# Patient Record
Sex: FEMALE | Race: WHITE | Employment: UNEMPLOYED | URBAN - METROPOLITAN AREA
[De-identification: names, ages, dates, MRNs, and addresses within clinical notes are randomized per-mention and may not be internally consistent; named-entity substitution may affect disease eponyms.]

---

## 2023-11-01 LAB
ABO, EXTERNAL RESULT: NORMAL
C. TRACHOMATIS, EXTERNAL RESULT: NEGATIVE
HEP B, EXTERNAL RESULT: NEGATIVE
HIV, EXTERNAL RESULT: NEGATIVE
N. GONORRHOEAE, EXTERNAL RESULT: NEGATIVE
RH FACTOR, EXTERNAL RESULT: POSITIVE
RPR, EXTERNAL RESULT: NORMAL
RUBELLA TITER, EXTERNAL RESULT: NORMAL

## 2024-04-03 ENCOUNTER — HOSPITAL ENCOUNTER (OUTPATIENT)
Facility: HOSPITAL | Age: 34
Setting detail: OBSERVATION
Discharge: HOME | End: 2024-04-03
Attending: OBSTETRICS & GYNECOLOGY | Admitting: OBSTETRICS & GYNECOLOGY
Payer: COMMERCIAL

## 2024-04-03 VITALS
HEART RATE: 90 BPM | SYSTOLIC BLOOD PRESSURE: 149 MMHG | RESPIRATION RATE: 16 BRPM | TEMPERATURE: 98.2 F | DIASTOLIC BLOOD PRESSURE: 73 MMHG | OXYGEN SATURATION: 97 %

## 2024-04-03 LAB
APPEARANCE UR: CLEAR
BILIRUB UR QL: NEGATIVE
COLOR UR: NORMAL
FIBRONECTIN FETAL VAG QL: NEGATIVE
GLUCOSE UR STRIP.AUTO-MCNC: NEGATIVE MG/DL
HGB UR QL STRIP: NEGATIVE
KETONES UR QL STRIP.AUTO: NEGATIVE MG/DL
LEUKOCYTE ESTERASE UR QL STRIP.AUTO: NEGATIVE
NITRITE UR QL STRIP.AUTO: NEGATIVE
PH UR STRIP: 8 (ref 5–8)
PROT UR STRIP-MCNC: NEGATIVE MG/DL
SP GR UR REFRACTOMETRY: <1.005 (ref 1–1.03)
UROBILINOGEN UR QL STRIP.AUTO: 0.2 EU/DL (ref 0.2–1)

## 2024-04-03 PROCEDURE — 96361 HYDRATE IV INFUSION ADD-ON: CPT

## 2024-04-03 PROCEDURE — 81002 URINALYSIS NONAUTO W/O SCOPE: CPT

## 2024-04-03 PROCEDURE — 96360 HYDRATION IV INFUSION INIT: CPT

## 2024-04-03 PROCEDURE — 2580000003 HC RX 258: Performed by: STUDENT IN AN ORGANIZED HEALTH CARE EDUCATION/TRAINING PROGRAM

## 2024-04-03 PROCEDURE — G0379 DIRECT REFER HOSPITAL OBSERV: HCPCS

## 2024-04-03 PROCEDURE — 82731 ASSAY OF FETAL FIBRONECTIN: CPT

## 2024-04-03 PROCEDURE — G0378 HOSPITAL OBSERVATION PER HR: HCPCS

## 2024-04-03 PROCEDURE — 2580000003 HC RX 258: Performed by: OBSTETRICS & GYNECOLOGY

## 2024-04-03 RX ORDER — SODIUM CHLORIDE, SODIUM LACTATE, POTASSIUM CHLORIDE, AND CALCIUM CHLORIDE .6; .31; .03; .02 G/100ML; G/100ML; G/100ML; G/100ML
1000 INJECTION, SOLUTION INTRAVENOUS ONCE
Status: COMPLETED | OUTPATIENT
Start: 2024-04-03 | End: 2024-04-03

## 2024-04-03 RX ADMIN — SODIUM CHLORIDE, POTASSIUM CHLORIDE, SODIUM LACTATE AND CALCIUM CHLORIDE 1000 ML: 600; 310; 30; 20 INJECTION, SOLUTION INTRAVENOUS at 12:14

## 2024-04-03 RX ADMIN — SODIUM CHLORIDE, POTASSIUM CHLORIDE, SODIUM LACTATE AND CALCIUM CHLORIDE 1000 ML: 600; 310; 30; 20 INJECTION, SOLUTION INTRAVENOUS at 10:00

## 2024-04-03 NOTE — H&P
History & Physical    Name: Orquidea Perera MRN: [id number]  SSN: [id number]    YOB: 1990  Age: 33 y.o.  Sex: female        Subjective:     Estimated Date of Delivery: 24    Ms. Perera is a  at 30w3d presenting to L&D for cramping, right sided abdominal pain. Denies LOF, VB. +GFM. Seen in the office earlier, cervix was 0/50 with 2+ ketones.     Initially zuly every 2-5 mins on L&D, received 2L IVF. Reports she is now feeling better. Some mild right sided abdominal pain, no cramping or contractions    Pregnancy complicated by:  Anxiety and depression: on zoloft 50mg daily  H/o partial thryoidectomy    OB History          3    Para   2    Term                AB        Living             SAB        IAB        Ectopic        Molar        Multiple        Live Births                  History reviewed. No pertinent past medical history.  Past Surgical History:   Procedure Laterality Date    THYROID LOBECTOMY       Social History     Occupational History    Not on file   Tobacco Use    Smoking status: Never    Smokeless tobacco: Never   Substance and Sexual Activity    Alcohol use: Not Currently    Drug use: Never    Sexual activity: Not on file     History reviewed. No pertinent family history.    Allergies   Allergen Reactions    Prednisone Hives     Prior to Admission medications    Not on File        Review of Systems: Pertinent items are noted in HPI.    Objective:     Vitals:  Vitals:    24 1531 24 1536 24 1541 24 1546   BP:       Pulse:       Resp:       Temp:       TempSrc:       SpO2: 92% 100% 99% 97%        Physical Exam:  Gen: NAD  Abd: Soft, nontender,     Membranes:  Intact  Fetal Heart Rate: Reactive  Baseline: 140 per minute  Variability: moderate  Accelerations: yes  Decelerations: none  Uterine contractions: irregular, every 10 minutes    SVE: 0/50/-3    Prenatal Labs:   No components found for: \"OBEXTABORH\", \"OBEXTABSCRN\", \"OBEXTRUBELLA\",

## 2024-04-03 NOTE — PROGRESS NOTES
1200: SBAR report received from HINA Jackman RN    1528:; Dr. De Jesus at bedside discussing POC with pt. Ffn ordered. SVE performed. Pt remains closed.     1621: Fetal fibronectin result is negative. Dr. De Jesus informed, okay to discharge pt at this time    1631: Discharge paperwork discussed with pt. Kick counts discussed, pt stated that she was knowledgeable about kick counts and understands how to perform them. What to expect at 30 weeks of pregnancy, jorden saldivar contractions and signs of labor were dicussed. When to contact physician was discussed and pt verbalized an understanding.     1640: Pt ambulates off unit.

## 2024-04-03 NOTE — PROGRESS NOTES
0936- Pt arrives to the unit ambulatory and stable sent from [facility] for malaise and dehydration.     1130- Bedside shift change report given to  RN (oncoming nurse) by Augustina RN (offgoing nurse). Report included the following information Nurse Handoff Report, Adult Overview, Intake/Output, MAR, and Recent Results.     1150- This RN spoke to MD De Jesus and notified her that pt is having contractions every 2-5 minutes that palpate mild and pt can feel some discomfort from some of them. Verbal order with read back for another 1,000mL bolus of lactated ringers and to send UA. Primary nurse  notified of orders.

## 2024-04-03 NOTE — LACTATION NOTE
Patient states that \"she plans to breastfeed.  She breast fed her second child but had to supplement at first.\"   Discussed with her the use of human donor milk if supplementation is needed.  Mom states\" I am comfortable with hand expression.\"  No questions about breastfeeding.

## 2024-05-05 ENCOUNTER — HOSPITAL ENCOUNTER (OUTPATIENT)
Facility: HOSPITAL | Age: 34
Setting detail: OBSERVATION
Discharge: HOME | End: 2024-05-05
Attending: OBSTETRICS & GYNECOLOGY | Admitting: OBSTETRICS & GYNECOLOGY
Payer: COMMERCIAL

## 2024-05-05 VITALS
RESPIRATION RATE: 16 BRPM | SYSTOLIC BLOOD PRESSURE: 113 MMHG | DIASTOLIC BLOOD PRESSURE: 66 MMHG | TEMPERATURE: 98 F | HEART RATE: 85 BPM | OXYGEN SATURATION: 99 %

## 2024-05-05 PROBLEM — Z3A.35 35 WEEKS GESTATION OF PREGNANCY: Status: ACTIVE | Noted: 2024-05-05

## 2024-05-05 PROBLEM — O98.813 OTHER MATERNAL INFECTIOUS AND PARASITIC DISEASES COMPLICATING PREGNANCY, THIRD TRIMESTER: Status: ACTIVE | Noted: 2024-05-05

## 2024-05-05 PROBLEM — A09 INFECTIOUS COLITIS, ENTERITIS, AND GASTROENTERITIS: Status: ACTIVE | Noted: 2024-05-05

## 2024-05-05 LAB
ALBUMIN SERPL-MCNC: 2.5 G/DL (ref 3.5–5)
ALBUMIN/GLOB SERPL: 0.8 (ref 1.1–2.2)
ALP SERPL-CCNC: 152 U/L (ref 45–117)
ALT SERPL-CCNC: 7 U/L (ref 12–78)
ANION GAP SERPL CALC-SCNC: 6 MMOL/L (ref 5–15)
AST SERPL-CCNC: 7 U/L (ref 15–37)
BASOPHILS # BLD: 0 K/UL (ref 0–0.1)
BASOPHILS NFR BLD: 0 % (ref 0–1)
BILIRUB SERPL-MCNC: 0.3 MG/DL (ref 0.2–1)
BUN SERPL-MCNC: 4 MG/DL (ref 6–20)
BUN/CREAT SERPL: 8 (ref 12–20)
CALCIUM SERPL-MCNC: 8.1 MG/DL (ref 8.5–10.1)
CHLORIDE SERPL-SCNC: 108 MMOL/L (ref 97–108)
CO2 SERPL-SCNC: 22 MMOL/L (ref 21–32)
CREAT SERPL-MCNC: 0.5 MG/DL (ref 0.55–1.02)
DIFFERENTIAL METHOD BLD: ABNORMAL
EOSINOPHIL # BLD: 0 K/UL (ref 0–0.4)
EOSINOPHIL NFR BLD: 0 % (ref 0–7)
ERYTHROCYTE [DISTWIDTH] IN BLOOD BY AUTOMATED COUNT: 13.3 % (ref 11.5–14.5)
GLOBULIN SER CALC-MCNC: 3.2 G/DL (ref 2–4)
GLUCOSE SERPL-MCNC: 114 MG/DL (ref 65–100)
HCT VFR BLD AUTO: 32.2 % (ref 35–47)
HGB BLD-MCNC: 10.3 G/DL (ref 11.5–16)
IMM GRANULOCYTES # BLD AUTO: 0 K/UL
IMM GRANULOCYTES NFR BLD AUTO: 0 %
LYMPHOCYTES # BLD: 1.9 K/UL (ref 0.8–3.5)
LYMPHOCYTES NFR BLD: 15 % (ref 12–49)
MCH RBC QN AUTO: 27.1 PG (ref 26–34)
MCHC RBC AUTO-ENTMCNC: 32 G/DL (ref 30–36.5)
MCV RBC AUTO: 84.7 FL (ref 80–99)
METAMYELOCYTES NFR BLD MANUAL: 1 %
MONOCYTES # BLD: 0.5 K/UL (ref 0–1)
MONOCYTES NFR BLD: 4 % (ref 5–13)
NEUTS SEG # BLD: 10.2 K/UL (ref 1.8–8)
NEUTS SEG NFR BLD: 80 % (ref 32–75)
NRBC # BLD: 0 K/UL (ref 0–0.01)
NRBC BLD-RTO: 0 PER 100 WBC
PLATELET # BLD AUTO: 256 K/UL (ref 150–400)
PLATELET COMMENT: ABNORMAL
PMV BLD AUTO: 10.6 FL (ref 8.9–12.9)
POTASSIUM SERPL-SCNC: 3.6 MMOL/L (ref 3.5–5.1)
PROT SERPL-MCNC: 5.7 G/DL (ref 6.4–8.2)
RBC # BLD AUTO: 3.8 M/UL (ref 3.8–5.2)
RBC MORPH BLD: ABNORMAL
SODIUM SERPL-SCNC: 136 MMOL/L (ref 136–145)
WBC # BLD AUTO: 12.7 K/UL (ref 3.6–11)

## 2024-05-05 PROCEDURE — G0378 HOSPITAL OBSERVATION PER HR: HCPCS

## 2024-05-05 PROCEDURE — 86900 BLOOD TYPING SEROLOGIC ABO: CPT

## 2024-05-05 PROCEDURE — 86850 RBC ANTIBODY SCREEN: CPT

## 2024-05-05 PROCEDURE — 36415 COLL VENOUS BLD VENIPUNCTURE: CPT

## 2024-05-05 PROCEDURE — 2580000003 HC RX 258: Performed by: OBSTETRICS & GYNECOLOGY

## 2024-05-05 PROCEDURE — 80053 COMPREHEN METABOLIC PANEL: CPT

## 2024-05-05 PROCEDURE — 96366 THER/PROPH/DIAG IV INF ADDON: CPT

## 2024-05-05 PROCEDURE — 96360 HYDRATION IV INFUSION INIT: CPT

## 2024-05-05 PROCEDURE — 99203 OFFICE O/P NEW LOW 30 MIN: CPT

## 2024-05-05 PROCEDURE — 59025 FETAL NON-STRESS TEST: CPT

## 2024-05-05 PROCEDURE — 94761 N-INVAS EAR/PLS OXIMETRY MLT: CPT

## 2024-05-05 PROCEDURE — 86901 BLOOD TYPING SEROLOGIC RH(D): CPT

## 2024-05-05 PROCEDURE — 85025 COMPLETE CBC W/AUTO DIFF WBC: CPT

## 2024-05-05 PROCEDURE — G0379 DIRECT REFER HOSPITAL OBSERV: HCPCS

## 2024-05-05 RX ORDER — ACETAMINOPHEN 500 MG
1000 TABLET ORAL EVERY 8 HOURS PRN
Status: DISCONTINUED | OUTPATIENT
Start: 2024-05-05 | End: 2024-05-05 | Stop reason: HOSPADM

## 2024-05-05 RX ORDER — SODIUM CHLORIDE, SODIUM LACTATE, POTASSIUM CHLORIDE, CALCIUM CHLORIDE 600; 310; 30; 20 MG/100ML; MG/100ML; MG/100ML; MG/100ML
INJECTION, SOLUTION INTRAVENOUS CONTINUOUS
Status: DISCONTINUED | OUTPATIENT
Start: 2024-05-05 | End: 2024-05-05 | Stop reason: HOSPADM

## 2024-05-05 RX ORDER — SODIUM CHLORIDE, SODIUM LACTATE, POTASSIUM CHLORIDE, AND CALCIUM CHLORIDE .6; .31; .03; .02 G/100ML; G/100ML; G/100ML; G/100ML
1000 INJECTION, SOLUTION INTRAVENOUS
Status: COMPLETED | OUTPATIENT
Start: 2024-05-05 | End: 2024-05-05

## 2024-05-05 RX ORDER — ONDANSETRON 2 MG/ML
4 INJECTION INTRAMUSCULAR; INTRAVENOUS EVERY 6 HOURS PRN
Status: DISCONTINUED | OUTPATIENT
Start: 2024-05-05 | End: 2024-05-05 | Stop reason: HOSPADM

## 2024-05-05 RX ADMIN — SODIUM CHLORIDE, POTASSIUM CHLORIDE, SODIUM LACTATE AND CALCIUM CHLORIDE 1000 ML: 600; 310; 30; 20 INJECTION, SOLUTION INTRAVENOUS at 11:11

## 2024-05-05 RX ADMIN — SODIUM CHLORIDE, POTASSIUM CHLORIDE, SODIUM LACTATE AND CALCIUM CHLORIDE: 600; 310; 30; 20 INJECTION, SOLUTION INTRAVENOUS at 12:17

## 2024-05-05 NOTE — DISCHARGE INSTRUCTIONS
belly has grown quite large. It's almost time to give birth! Your baby's lungs are almost ready to breathe air. The skull bones are firm enough to protect your baby's head. But they're soft enough to move down through the birth canal.    You might be wondering what to expect during labor. Because each birth is different, there's no way to know exactly what childbirth will be like for you. Talk to your doctor or midwife about any concerns you have.    You'll probably have a test for group B streptococcus (GBS). GBS is bacteria that can live in the vagina and rectum. GBS can make your baby sick after birth. If you test positive, you'll get antibiotics during labor.    Choose what type of pain relief you would like during labor.  You can choose from a few types, including medicine and non-medicine options. You may want to use several types of pain relief.     Know how medicines can help with pain during labor.  Some medicines lower anxiety and help with some of the pain. Others make your lower body numb so that you will feel less pain.     Tell your doctor about your pain medicine choice.  Do this before you start labor or very early in your labor. You may be able to change your mind during labor.     Learn about the stages of labor.    The first stage includes the early (latent) and active phases of labor. Contractions start in early labor. During active labor, contractions get stronger, last longer, and happen more often. And the cervix opens more rapidly.  The second stage starts when you're ready to push. During this stage, your baby is born.  During the third stage, you'll usually have a few more contractions to push out the placenta.   Follow-up care is a key part of your treatment and safety. Be sure to make and go to all appointments, and call your doctor if you are having problems. It's also a good idea to know your test results and keep a list of the medicines you take.  Where can you learn more?  Go to

## 2024-05-05 NOTE — PROGRESS NOTES
Patient feels better.  Vitals:    05/05/24 1316   BP: 113/66   Pulse: 85   Resp: 16   Temp:    SpO2: 99%     Physical Exam  Constitutional:       General: She is not in acute distress.  Neurological:      Mental Status: She is alert.       Recent Labs     05/05/24  1112   WBC 12.7*   HGB 10.3*         K 3.6      CO2 22   BUN 4*   CREATININE 0.50*   GLUCOSE 114*   ALT 7*   AST 7*   ALKPHOS 152*     Principal Problem:    Other maternal infectious and parasitic diseases complicating pregnancy, third trimester  Active Problems:    Infectious colitis, enteritis, and gastroenteritis    35 weeks gestation of pregnancy  Resolved Problems:    * No resolved hospital problems. *    Will discharge home  Follow up as scheduled

## 2024-05-05 NOTE — H&P
History & Physical    Name: Orquidea Perera MRN: [id number]  SSN: [id number]    YOB: 1990  Age: 33 y.o.  Sex: female        Subjective:   Chief Complaint: Diarrhea  Estimated Date of Delivery: 24  OB History    Para Term  AB Living   3 2 2     2   SAB IAB Ectopic Molar Multiple Live Births             2      # Outcome Date GA Lbr Pankaj/2nd Weight Sex Delivery Anes PTL Lv   3 Current            2 Term      Vag-Spont   DARRIN   1 Term      Vag-Spont   DARRIN       Orquidea Perera, 33 y.o.,  ,  presents at 35w0d, complaining of Diarrhea.  She complains of diarrhea, that started yesterday morning.  She had four watery stools.  Things got better last night.  This morning,  the diarrhea returned with two watery stools.  She has nausea, but no vomiting.  She denies fevers and blood  in the stools.  She feels \"dehydrated\".  Sh is having abdominal cramps.  She reports good fetal movement.   Prenatal course was normal. Please see prenatal records for details.    Allergies   Allergen Reactions    Prednisone Hives       Prior to Admission medications    Not on File       Past Medical History:   Diagnosis Date    Postpartum depression        Past Surgical History:   Procedure Laterality Date    THYROID LOBECTOMY         Social History     Occupational History    Not on file   Tobacco Use    Smoking status: Never    Smokeless tobacco: Never   Substance and Sexual Activity    Alcohol use: Not Currently    Drug use: Never    Sexual activity: Not on file       History reviewed. No pertinent family history.    Review of Systems   All other systems reviewed and are negative.          Objective:     Physical Exam:    Patient Vitals for the past 24 hrs:   BP Temp Temp src Pulse Resp SpO2   24 1046 119/71 98 °F (36.7 °C) Oral 75 16 99 %         General:   33 y.o.  female who appears to be in no acute distress.    HEENT:   Normocephalic.  Pupils are equal, round, and reactive to light.

## 2024-05-05 NOTE — PROGRESS NOTES
, 35w gestation arrived with c/o diarrhea since yesterday with multiple episodes and malaise, endorses +FM and BH contractions/ mild lower pelvic cramping. Denies vomiting, fever, cp, sob, VB, and LOF and said she has been able to eat and drink minimally. She reports a family member was vomiting earlier this week that she was around. Dr. William notified of her arrival and came to bedside, on EFM and VSS. Labs sent and fluids initiated, see orders.     1254 Reactive NST obtained. Pt seen by Dr. William with decisions to be discharged as she states she is feeling better and was able to eat lunch.     1330 Pt given discharge instructions, pt education, prescriptions, and follow up information. PT verbalized understanding. All questions answered. Pt discharged home in private vehicle, ambulatory. Pt A&O x4, RA, pain controlled, BP within defined limits.

## 2024-05-06 LAB
ABO + RH BLD: NORMAL
SPECIMEN EXP DATE BLD: NORMAL

## 2024-05-09 ENCOUNTER — ROUTINE PRENATAL (OUTPATIENT)
Age: 34
End: 2024-05-09

## 2024-05-09 VITALS
RESPIRATION RATE: 16 BRPM | DIASTOLIC BLOOD PRESSURE: 87 MMHG | BODY MASS INDEX: 26.37 KG/M2 | HEART RATE: 94 BPM | WEIGHT: 158.3 LBS | OXYGEN SATURATION: 100 % | HEIGHT: 65 IN | SYSTOLIC BLOOD PRESSURE: 127 MMHG

## 2024-05-09 DIAGNOSIS — O24.419 GESTATIONAL DIABETES MELLITUS (GDM) IN THIRD TRIMESTER, GESTATIONAL DIABETES METHOD OF CONTROL UNSPECIFIED: Primary | ICD-10-CM

## 2024-05-09 DIAGNOSIS — O24.419 GDM (GESTATIONAL DIABETES MELLITUS): ICD-10-CM

## 2024-05-09 RX ORDER — ONDANSETRON 4 MG/1
TABLET, ORALLY DISINTEGRATING ORAL
COMMUNITY
Start: 2024-02-09

## 2024-05-09 NOTE — PROCEDURES
PATIENT: NUZHAT TAMAYO   -  : 1990   -  DOS:2024   -  INTERPRETING PROVIDER:Cher Vance,   Indication  ========    Gestational diabetes    Method  ======    Transabdominal ultrasound examination. View: Suboptimal view: limited by late gestational age    Dating  ======    LMP on: 9/3/2023  GA by LMP 35 w + 4 d  RICHARD by LMP: 2024  Previous Ultrasound on: 2023  Type of prior assessment: GA  GA at prior assessment date 8 w + 0 d  GA by previous U/S 35 w + 1 d  RICHARD by previous Ultrasound: 2024  Ultrasound examination on: 2024  GA by U/S based upon: AC, BPD, Femur, HC  GA by U/S 36 w + 6 d  RICHARD by U/S: 2024  Assigned: based on the LMP, selected on 2024  Assigned GA 35 w + 4 d  Assigned RICHARD: 2024    Fetal Growth Overview  =================    Exam date        GA              BPD (mm)          HC (mm)              AC (mm)              FL (mm)             HL (mm)         EFW (g)  2024          35w 4d        90.4    83%        330.3     69%        336.3    96%        69.8     50%                             3096    85%    Fetal Biometry  ============    Standard  BPD 90.4 mm 36w 4d 83% Hadlock  .0 mm -/- >99% Lucina  .3 mm 37w 4d 69% Hadlock  Cerebellum tr 47.3 mm 35w 4d 42% Hill  .3 mm 37w 4d 96% Hadlock  Femur 69.8 mm 35w 6d 50% Hadlock  EFW 3,096 g 37w 2d 85% Hadlock  EFW (lb) 6 lb  EFW (oz) 13 oz  EFW by: Hadlock (BPD-HC-AC-FL)  Extended   1.3 mm  CM 8.5 mm  74% Nicolaides  Head / Face / Neck  Nasal bone: present  Other Structures   bpm    General Evaluation  ==============    Cardiac activity present.  bpm. Fetal movements: visualized. Presentation: Cephalic  Placenta: Placental site: anterior, appropriate distance from the internal os  Umbilical cord: Cord vessels: 3 vessel cord. Insertion site: central  Amniotic fluid: Amount of AF: polyhydramnios. MVP 11.6 cm. IVAN 30.9 cm. Q1 5.4 cm, Q2 11.6 cm, Q3 7.2 cm, Q4 6.8 cm    Fetal

## 2024-05-10 LAB — GBS, EXTERNAL RESULT: NEGATIVE

## 2024-05-14 ENCOUNTER — TELEMEDICINE (OUTPATIENT)
Age: 34
End: 2024-05-14
Payer: COMMERCIAL

## 2024-05-14 DIAGNOSIS — O24.419 GESTATIONAL DIABETES MELLITUS (GDM), ANTEPARTUM, GESTATIONAL DIABETES METHOD OF CONTROL UNSPECIFIED: Primary | ICD-10-CM

## 2024-05-14 PROCEDURE — G0108 DIAB MANAGE TRN  PER INDIV: HCPCS

## 2024-05-14 NOTE — PROGRESS NOTES
[facility]  Diabetes Self-Management Education & Support Program  Encounter Note      SUMMARY  Diabetes self-care management training was completed related to healthy eating. The participant will return on TBD/as needed per patient to continue DSMES related to healthy eating and taking medications. The participant did not identify SMART Goal(s) and will practice knowledge and skills related to healthy eating and monitoring to improve diabetes self-management.        EVALUATION:  Ms. Perera expressed understanding of all topics related to Healthy Eating, see boxes below.  She is ready to use nutrition labels/carb counting as the primary tool to manage carbs at each meal and she is ready to use the Healthy Plate model when measuring/counting carbs is not feasible. She demonstrated carb counting during the visit.     Ms. Perera states monitoring is going well, she will share her readings with OB at next appt.  All fasting glucose <90, 1 hr post prandials <140, with the exception of one or two meals that were carb heavy.    RECOMMENDATIONS:  I will send Ms. Perera a pdf of the Healthy Eating information we covered today during the virtual visit, along with a link to an online video about insulin administration during pregnancy, and the telephone number for [facility], should she feel the need for another appointment.      TOPICS DISCUSSED TODAY:  WHAT CAN I EAT? 30      Next provider visit is scheduled for - TBD, as needed per patient       DATE DSMES TOPIC EVALUATION     5/14/2024 WHAT IS DIABETES?   Role of the normal pancreas in energy balance and blood glucose control   The defect seen in diabetes   Signs & symptoms of diabetes   Diagnosis of diabetes   Types of diabetes   Blood glucose targets in non-pregnant & non-pregnant adults       The participant knows  Their type of diabetes: Yes   The basic physiologic defect: Yes  Blood glucose targets: Yes     DATE DSMES TOPIC EVALUATION

## 2024-05-17 ENCOUNTER — ROUTINE PRENATAL (OUTPATIENT)
Age: 34
End: 2024-05-17

## 2024-05-17 VITALS
HEART RATE: 90 BPM | WEIGHT: 158.2 LBS | SYSTOLIC BLOOD PRESSURE: 111 MMHG | BODY MASS INDEX: 26.33 KG/M2 | DIASTOLIC BLOOD PRESSURE: 73 MMHG

## 2024-05-17 DIAGNOSIS — O40.3XX0 POLYHYDRAMNIOS AFFECTING PREGNANCY IN THIRD TRIMESTER: ICD-10-CM

## 2024-05-17 DIAGNOSIS — O36.63X0 MACROSOMIA OF FETUS AFFECTING MANAGEMENT OF MOTHER IN THIRD TRIMESTER, SINGLE OR UNSPECIFIED FETUS: ICD-10-CM

## 2024-05-17 DIAGNOSIS — O24.419 GESTATIONAL DIABETES MELLITUS (GDM) IN THIRD TRIMESTER, GESTATIONAL DIABETES METHOD OF CONTROL UNSPECIFIED: Primary | ICD-10-CM

## 2024-05-17 NOTE — PROCEDURES
PATIENT: NUZHAT TAMAYO   -  : 1990   -  DOS:2024   -  INTERPRETING PROVIDER:Bebo Angulo,   Indication  ========    Gestational diabetes    Method  ======    Transabdominal ultrasound examination. View: Sufficient    Pregnancy  =========    Barrios pregnancy. Number of fetuses: 1    Dating  ======    LMP on: 9/3/2023  GA by LMP 36 w + 5 d  RICHARD by LMP: 2024  Previous Ultrasound on: 2023  Type of prior assessment: GA  GA at prior assessment date 8 w + 0 d  GA by previous U/S 36 w + 2 d  RICHARD by previous Ultrasound: 2024  Assigned: based on the LMP, selected on 2024  Assigned GA 36 w + 5 d  Assigned RICHARD: 2024    General Evaluation  ==============    Cardiac activity present.  bpm. Fetal movements: visualized. Presentation: Cephalic  Placenta: Placental site: anterior, appropriate distance from the internal os  Umbilical cord: Cord vessels: 3 vessel cord    Fetal Anatomy  ===========    Face  Palate: SUBOPTIMAL  RVOT view: SUBOPTIMAL  LVOT view: SUBOPTIMAL  Heart / Thorax  Situs: situs solitus (normal)  Aortic arch view: NOT VISUALIZED  Bicaval view: NOT VISUALIZED  Ductal arch view: NOT VISUALIZED  Interventricular septum: normal  Cardiac position: levocardia (normal)  Cardiac axis: normal  Cardiac size: normal (approx. 1/3 of thoracic area)  Cardiac rhythm: regular (normal)  Rt lung: normal  Lt lung: normal  Diaphragm: normal  Cord insertion: NOT VISUALIZED  Stomach: normal  Kidneys: normal  Bladder: normal  Rt fingers: SUBOPTIMAL  Lt hand: SUBOPTIMAL  Lt fingers: NOT VISUALIZED  Rt leg: NOT VISUALIZED  Rt toes: normal  Wants to know fetal sex: yes    Amniotic Fluid Assessment  =====================    Amount of AF: polyhydramnios  MVP 11.4 cm. IVAN 41.2 cm. Q1 10.5 cm, Q2 10.5 cm, Q3 8.9 cm, Q4 11.4 cm    Biophysical Profile  ==============    2: Fetal breathing movements  2: Gross body movements  2: Fetal tone  2: Amniotic fluid volume  8/8 Biophysical profile

## 2024-05-24 ENCOUNTER — ROUTINE PRENATAL (OUTPATIENT)
Age: 34
End: 2024-05-24

## 2024-05-24 VITALS — HEART RATE: 112 BPM | DIASTOLIC BLOOD PRESSURE: 73 MMHG | SYSTOLIC BLOOD PRESSURE: 108 MMHG

## 2024-05-24 DIAGNOSIS — O24.410 DIET CONTROLLED GESTATIONAL DIABETES MELLITUS (GDM) IN THIRD TRIMESTER: Primary | ICD-10-CM

## 2024-05-24 DIAGNOSIS — E89.0 HISTORY OF PARTIAL THYROIDECTOMY: ICD-10-CM

## 2024-05-24 DIAGNOSIS — O26.893 SHORTNESS OF BREATH DUE TO PREGNANCY IN THIRD TRIMESTER: ICD-10-CM

## 2024-05-24 DIAGNOSIS — O40.3XX0 POLYHYDRAMNIOS AFFECTING PREGNANCY IN THIRD TRIMESTER: ICD-10-CM

## 2024-05-24 DIAGNOSIS — O24.419 GESTATIONAL DIABETES MELLITUS (GDM) IN THIRD TRIMESTER, GESTATIONAL DIABETES METHOD OF CONTROL UNSPECIFIED: Primary | ICD-10-CM

## 2024-05-24 DIAGNOSIS — Z86.32 HISTORY OF GESTATIONAL DIABETES MELLITUS (GDM): ICD-10-CM

## 2024-05-24 DIAGNOSIS — R06.02 SHORTNESS OF BREATH DUE TO PREGNANCY IN THIRD TRIMESTER: ICD-10-CM

## 2024-05-24 DIAGNOSIS — R06.02 SHORTNESS OF BREATH DUE TO PREGNANCY: ICD-10-CM

## 2024-05-24 DIAGNOSIS — O26.899 SHORTNESS OF BREATH DUE TO PREGNANCY: ICD-10-CM

## 2024-05-24 NOTE — PROGRESS NOTES
Assessment & Plan   ASSESSMENT/PLAN:  1. Diet controlled gestational diabetes mellitus (GDM) in third trimester  2. Polyhydramnios affecting pregnancy in third trimester  3. History of gestational diabetes mellitus (GDM)  4. History of partial thyroidectomy  5. Shortness of breath due to pregnancy      Orquidea is a 34 yo  seen for the following:     Suspected GDM/Hx GDM/Polyhydramnios (IVAN 35.3cm)  -S/P diabetes ed.  -Patient declined formal GDM screening/testing.  -Patient presents with BS logs that generally reflects good control with isolated deviations from threshold r/t increased sugar at lunch.   --Kick count instructions, and PTL precautions reviewed.  -Reports will discuss IOL plans at OB visit today.      Hx Hemithyroidectomy  -Reports thyroid function returned to normal post hemithyroidectomy, but she has been lost to f/u x 2 yrs.  -Please check TSH if not recently done.     cffDNA was low-risk     Intermittent increased Shortness of Breath  -Pt c/o intermittent increased SOB with activities, relieved with rest.   -Denies headaches, visual changes, right upper quadrant or epigastric pain. Denies leg/calf pain or tenderness.   -/73 P 112, O2 98%  -PIH and cardiac precautions reviewed. Pt aware of S/S to report to the ED.      Recommendations:  - Continue glucose surveillance with FBS and 1-hour PP values.  - Interval growth as scheduled. Next in 1 week   - Continue weekly  testing.  - Delivery at 39 weeks    Please see Viewpoint for ultrasound findings.        Subjective   Orquidea Perera (:  1990) is a 33 y.o. female,Established patient, here for evaluation of the following chief complaint(s):  1. Diet controlled gestational diabetes mellitus (GDM) in third trimester  2. Polyhydramnios affecting pregnancy in third trimester  3. History of gestational diabetes mellitus (GDM)  4. History of partial thyroidectomy  5. Shortness of breath due to pregnancy       Objective

## 2024-05-24 NOTE — PROCEDURES
PATIENT: NUZHAT TAMAYO   -  : 1990   -  DOS:2024   -  INTERPRETING PROVIDER:Cher Vance,   Indication  ========    Gestational diabetes (A1), Polyhydramnios    Method  ======    Transabdominal ultrasound examination. View: Sufficient    Pregnancy  =========    Barrios pregnancy. Number of fetuses: 1    Dating  ======    LMP on: 9/3/2023  GA by LMP 37 w + 5 d  RICHARD by LMP: 2024  Previous Ultrasound on: 2023  Type of prior assessment: GA  GA at prior assessment date 8 w + 0 d  GA by previous U/S 37 w + 2 d  RICHARD by previous Ultrasound: 2024  Assigned: based on the LMP, selected on 2024  Assigned GA 37 w + 5 d  Assigned RICHARD: 2024    General Evaluation  ==============    Cardiac activity present.  bpm. Fetal movements: visualized. Presentation: Cephalic  Placenta: Placental site: anterior, appropriate distance from the internal os  Umbilical cord: Cord vessels: 3 vessel cord    Fetal Anatomy  ===========    Face  Palate: SUBOPTIMAL  RVOT view: SUBOPTIMAL  LVOT view: SUBOPTIMAL  Heart / Thorax  Situs: situs solitus (normal)  Aortic arch view: NOT VISUALIZED  Bicaval view: NOT VISUALIZED  Ductal arch view: NOT VISUALIZED  Cardiac rhythm: regular (normal)  Diaphragm: normal  Cord insertion: NOT VISUALIZED  Stomach: normal  Kidneys: normal  Bladder: normal  Rt fingers: SUBOPTIMAL  Lt hand: SUBOPTIMAL  Lt fingers: NOT VISUALIZED  Rt leg: NOT VISUALIZED  Rt toes: normal  Wants to know fetal sex: yes    Amniotic Fluid Assessment  =====================    Amount of AF: polyhydramnios  MVP 11.4 cm. IVAN 35.3 cm. Q1 11.1 cm, Q2 6.8 cm, Q3 6.0 cm, Q4 11.4 cm    Biophysical Profile  ==============    2: Fetal breathing movements  2: Gross body movements  2: Fetal tone  2: Amniotic fluid volume   Biophysical profile score    Findings  =======    Intrauterine Barrios pregnancy at 37w 5d by clinical dates.  Amniotic fluid: polyhydramnios.  Placenta is anterior, appropriate

## 2024-05-31 ENCOUNTER — ROUTINE PRENATAL (OUTPATIENT)
Age: 34
End: 2024-05-31

## 2024-05-31 VITALS — HEART RATE: 80 BPM | SYSTOLIC BLOOD PRESSURE: 112 MMHG | DIASTOLIC BLOOD PRESSURE: 71 MMHG

## 2024-05-31 DIAGNOSIS — Z86.32 HISTORY OF GESTATIONAL DIABETES MELLITUS (GDM): ICD-10-CM

## 2024-05-31 DIAGNOSIS — O24.410 DIET CONTROLLED GESTATIONAL DIABETES MELLITUS (GDM) IN THIRD TRIMESTER: Primary | ICD-10-CM

## 2024-05-31 DIAGNOSIS — E89.0 HISTORY OF PARTIAL THYROIDECTOMY: ICD-10-CM

## 2024-05-31 DIAGNOSIS — O40.3XX0 POLYHYDRAMNIOS AFFECTING PREGNANCY IN THIRD TRIMESTER: ICD-10-CM

## 2024-05-31 DIAGNOSIS — O36.63X0 MACROSOMIA OF FETUS AFFECTING MANAGEMENT OF MOTHER IN THIRD TRIMESTER, SINGLE OR UNSPECIFIED FETUS: ICD-10-CM

## 2024-05-31 NOTE — PROCEDURES
VISUALIZED  Rt leg: NOT VISUALIZED  Wants to know fetal sex: yes    Biophysical Profile  ==============    2: Fetal breathing movements  2: Gross body movements  2: Fetal tone  2: Amniotic fluid volume  8/8 Biophysical profile score    Findings  =======    Intrauterine Barrios pregnancy at 38w 5d by clinical dates.  EFW is 3878 g at 87%, abdominal circumference at >99%.  Anatomy visualized as stated above.  Amniotic fluid: polyhydramnios.  Placenta is anterior, appropriate distance from the internal os.  Cephalic presentation.    Biophysical profile score is 8/8.    Consultation  ==========    NP Note 24  Orquidea is a 34 yo  seen for the following:    IUP 38 weeks    Suspected GDM/Hx GDM/Polyhydramnios (IVAN 33.2cm)/LGA  -S/P diabetes ed.  -Patient declined formal GDM screening/testing.  -Glucose log reviewed: Fasting glucose 89-98 (~70% elevated), 1 hr pp 110-130). Due pending IOL in 3 days medication modification not initiated for elevated fasting  glucose. Pt is to continue high protein snack at bedtime. Orquidea is to continue glucose surveillance and is aware to report elevated blood sugars and S/S to report to the  ED/L&D.  -Kick count instructions, PIH and Labor precautions reviewed.  -Pt reports IOL scheduled 6/3-    Hx Hemithyroidectomy  -Reports thyroid function returned to normal post hemithyroidectomy, but she has been lost to f/u x 2 yrs.  -Please check TSH if not recently done.-No new labs 24    cffDNA was low-risk    24  Follow up for probable GDM  Interval growth is in the upper limits of normal though the AC is > 99%  Reassuring BPP  Addressing blood sugars with insulin would probably be warranted but is precluded by the late gestation and planned induction in 3 days.      Recommendations:  - Continue glucose surveillance with FBS and 1-hour PP values.  - Delivery at 39 weeks; Pt reports 6/3 for cervical ripening;  for IOL  - Follow up as clinically

## 2024-05-31 NOTE — PROGRESS NOTES
Assessment & Plan   ASSESSMENT/PLAN:  1. Diet controlled gestational diabetes mellitus (GDM) in third trimester  2. History of gestational diabetes mellitus (GDM)  3. Polyhydramnios affecting pregnancy in third trimester  4. Macrosomia of fetus affecting management of mother in third trimester, single or unspecified fetus  5. History of partial thyroidectomy    Orquidea is a 32 yo  seen for the following:     Suspected GDM/Hx GDM/Polyhydramnios (IVAN 33.2cm)/LGA  -S/P diabetes ed.  -Patient declined formal GDM screening/testing.  -Glucose log reviewed: Fasting glucose 89-98 (~70% elevated), 1 hr pp 110-130). Due pending IOL in 3 days medication modification not initiated for elevated fasting glucose. Pt is to continue high protein snack at bedtime. Orquidea is to continue glucose surveillance and is aware to report elevated blood sugars and S/S to report to the ED/L&D.   -Kick count instructions, PIH and Labor precautions reviewed.  -Pt reports IOL scheduled 6/3-     Hx Hemithyroidectomy  -Reports thyroid function returned to normal post hemithyroidectomy, but she has been lost to f/u x 2 yrs.  -Please check TSH if not recently done.-No new labs 24     cffDNA was low-risk     Recommendations:  - Continue glucose surveillance with FBS and 1-hour PP values.  - Delivery at 39 weeks; Pt reports 6/3 for cervical ripening;  for IOL   - Follow up as clinically indicated.      Please see Viewpoint for ultrasound findings and MD note.      Subjective   Orquidea Perera (:  1990) is a 33 y.o. female,Established patient, here for evaluation of the following chief complaint(s):Established patient  1. Diet controlled gestational diabetes mellitus (GDM) in third trimester  2. History of gestational diabetes mellitus (GDM)  3. Polyhydramnios affecting pregnancy in third trimester  4. Macrosomia of fetus affecting management of mother in third trimester, single or unspecified fetus  5. History of partial

## 2024-06-03 ENCOUNTER — HOSPITAL ENCOUNTER (INPATIENT)
Facility: HOSPITAL | Age: 34
LOS: 3 days | Discharge: HOME | End: 2024-06-06
Attending: OBSTETRICS & GYNECOLOGY | Admitting: STUDENT IN AN ORGANIZED HEALTH CARE EDUCATION/TRAINING PROGRAM
Payer: COMMERCIAL

## 2024-06-03 PROBLEM — O24.419 GESTATIONAL DIABETES: Status: ACTIVE | Noted: 2024-06-03

## 2024-06-03 LAB
ABO + RH BLD: NORMAL
BASOPHILS # BLD: 0.1 K/UL (ref 0–0.1)
BASOPHILS NFR BLD: 1 % (ref 0–1)
BLOOD GROUP ANTIBODIES SERPL: NORMAL
COMMENT:: NORMAL
DIFFERENTIAL METHOD BLD: ABNORMAL
EOSINOPHIL # BLD: 0.1 K/UL (ref 0–0.4)
EOSINOPHIL NFR BLD: 1 % (ref 0–7)
ERYTHROCYTE [DISTWIDTH] IN BLOOD BY AUTOMATED COUNT: 13.7 % (ref 11.5–14.5)
GLUCOSE BLD STRIP.AUTO-MCNC: 109 MG/DL (ref 65–117)
GLUCOSE BLD STRIP.AUTO-MCNC: 88 MG/DL (ref 65–117)
HCT VFR BLD AUTO: 30.9 % (ref 35–47)
HGB BLD-MCNC: 9.8 G/DL (ref 11.5–16)
IMM GRANULOCYTES # BLD AUTO: 0.2 K/UL (ref 0–0.04)
IMM GRANULOCYTES NFR BLD AUTO: 2 % (ref 0–0.5)
LYMPHOCYTES # BLD: 1.6 K/UL (ref 0.8–3.5)
LYMPHOCYTES NFR BLD: 15 % (ref 12–49)
MCH RBC QN AUTO: 26.1 PG (ref 26–34)
MCHC RBC AUTO-ENTMCNC: 31.7 G/DL (ref 30–36.5)
MCV RBC AUTO: 82.4 FL (ref 80–99)
MONOCYTES # BLD: 0.6 K/UL (ref 0–1)
MONOCYTES NFR BLD: 6 % (ref 5–13)
NEUTS SEG # BLD: 8.2 K/UL (ref 1.8–8)
NEUTS SEG NFR BLD: 75 % (ref 32–75)
NRBC # BLD: 0 K/UL (ref 0–0.01)
NRBC BLD-RTO: 0 PER 100 WBC
PLATELET # BLD AUTO: 275 K/UL (ref 150–400)
PMV BLD AUTO: 10.5 FL (ref 8.9–12.9)
RBC # BLD AUTO: 3.75 M/UL (ref 3.8–5.2)
SERVICE CMNT-IMP: NORMAL
SERVICE CMNT-IMP: NORMAL
SPECIMEN EXP DATE BLD: NORMAL
SPECIMEN HOLD: NORMAL
WBC # BLD AUTO: 10.7 K/UL (ref 3.6–11)

## 2024-06-03 PROCEDURE — 86780 TREPONEMA PALLIDUM: CPT

## 2024-06-03 PROCEDURE — 86850 RBC ANTIBODY SCREEN: CPT

## 2024-06-03 PROCEDURE — 85025 COMPLETE CBC W/AUTO DIFF WBC: CPT

## 2024-06-03 PROCEDURE — 86900 BLOOD TYPING SEROLOGIC ABO: CPT

## 2024-06-03 PROCEDURE — 86901 BLOOD TYPING SEROLOGIC RH(D): CPT

## 2024-06-03 PROCEDURE — 36415 COLL VENOUS BLD VENIPUNCTURE: CPT

## 2024-06-03 PROCEDURE — 59200 INSERT CERVICAL DILATOR: CPT | Performed by: OBSTETRICS & GYNECOLOGY

## 2024-06-03 PROCEDURE — 94761 N-INVAS EAR/PLS OXIMETRY MLT: CPT

## 2024-06-03 PROCEDURE — 82962 GLUCOSE BLOOD TEST: CPT

## 2024-06-03 PROCEDURE — 7210000100 HC LABOR FEE PER 1 HR: Performed by: OBSTETRICS & GYNECOLOGY

## 2024-06-03 PROCEDURE — 1100000000 HC RM PRIVATE

## 2024-06-03 RX ORDER — SIMETHICONE 80 MG
80 TABLET,CHEWABLE ORAL EVERY 6 HOURS PRN
Status: DISCONTINUED | OUTPATIENT
Start: 2024-06-03 | End: 2024-06-04

## 2024-06-03 RX ORDER — SODIUM CHLORIDE 9 MG/ML
25 INJECTION, SOLUTION INTRAVENOUS PRN
Status: DISCONTINUED | OUTPATIENT
Start: 2024-06-03 | End: 2024-06-04

## 2024-06-03 RX ORDER — CARBOPROST TROMETHAMINE 250 UG/ML
250 INJECTION, SOLUTION INTRAMUSCULAR PRN
Status: DISCONTINUED | OUTPATIENT
Start: 2024-06-03 | End: 2024-06-04

## 2024-06-03 RX ORDER — TERBUTALINE SULFATE 1 MG/ML
0.25 INJECTION, SOLUTION SUBCUTANEOUS
Status: DISCONTINUED | OUTPATIENT
Start: 2024-06-03 | End: 2024-06-04

## 2024-06-03 RX ORDER — INSULIN LISPRO 100 [IU]/ML
0-4 INJECTION, SOLUTION INTRAVENOUS; SUBCUTANEOUS NIGHTLY
Status: DISCONTINUED | OUTPATIENT
Start: 2024-06-03 | End: 2024-06-04

## 2024-06-03 RX ORDER — TRANEXAMIC ACID 10 MG/ML
1000 INJECTION, SOLUTION INTRAVENOUS
Status: DISCONTINUED | OUTPATIENT
Start: 2024-06-03 | End: 2024-06-04

## 2024-06-03 RX ORDER — SODIUM CHLORIDE, SODIUM LACTATE, POTASSIUM CHLORIDE, AND CALCIUM CHLORIDE .6; .31; .03; .02 G/100ML; G/100ML; G/100ML; G/100ML
1000 INJECTION, SOLUTION INTRAVENOUS PRN
Status: DISCONTINUED | OUTPATIENT
Start: 2024-06-03 | End: 2024-06-04

## 2024-06-03 RX ORDER — SODIUM CHLORIDE 0.9 % (FLUSH) 0.9 %
5-40 SYRINGE (ML) INJECTION EVERY 12 HOURS SCHEDULED
Status: DISCONTINUED | OUTPATIENT
Start: 2024-06-03 | End: 2024-06-04

## 2024-06-03 RX ORDER — ONDANSETRON 2 MG/ML
4 INJECTION INTRAMUSCULAR; INTRAVENOUS EVERY 6 HOURS PRN
Status: DISCONTINUED | OUTPATIENT
Start: 2024-06-03 | End: 2024-06-04

## 2024-06-03 RX ORDER — SODIUM CHLORIDE, SODIUM LACTATE, POTASSIUM CHLORIDE, CALCIUM CHLORIDE 600; 310; 30; 20 MG/100ML; MG/100ML; MG/100ML; MG/100ML
INJECTION, SOLUTION INTRAVENOUS CONTINUOUS
Status: DISCONTINUED | OUTPATIENT
Start: 2024-06-03 | End: 2024-06-04

## 2024-06-03 RX ORDER — DOCUSATE SODIUM 100 MG/1
100 CAPSULE, LIQUID FILLED ORAL 2 TIMES DAILY
Status: DISCONTINUED | OUTPATIENT
Start: 2024-06-03 | End: 2024-06-04

## 2024-06-03 RX ORDER — DEXTROSE MONOHYDRATE 100 MG/ML
INJECTION, SOLUTION INTRAVENOUS CONTINUOUS PRN
Status: DISCONTINUED | OUTPATIENT
Start: 2024-06-03 | End: 2024-06-04

## 2024-06-03 RX ORDER — SODIUM CHLORIDE 0.9 % (FLUSH) 0.9 %
5-40 SYRINGE (ML) INJECTION PRN
Status: DISCONTINUED | OUTPATIENT
Start: 2024-06-03 | End: 2024-06-04

## 2024-06-03 RX ORDER — INSULIN LISPRO 100 [IU]/ML
0-4 INJECTION, SOLUTION INTRAVENOUS; SUBCUTANEOUS
Status: DISCONTINUED | OUTPATIENT
Start: 2024-06-03 | End: 2024-06-04

## 2024-06-03 RX ORDER — METHYLERGONOVINE MALEATE 0.2 MG/ML
200 INJECTION INTRAVENOUS PRN
Status: DISCONTINUED | OUTPATIENT
Start: 2024-06-03 | End: 2024-06-04

## 2024-06-03 RX ORDER — MISOPROSTOL 200 UG/1
400 TABLET ORAL PRN
Status: DISCONTINUED | OUTPATIENT
Start: 2024-06-03 | End: 2024-06-04

## 2024-06-03 RX ORDER — SODIUM CHLORIDE, SODIUM LACTATE, POTASSIUM CHLORIDE, AND CALCIUM CHLORIDE .6; .31; .03; .02 G/100ML; G/100ML; G/100ML; G/100ML
500 INJECTION, SOLUTION INTRAVENOUS PRN
Status: DISCONTINUED | OUTPATIENT
Start: 2024-06-03 | End: 2024-06-04

## 2024-06-03 RX ORDER — ONDANSETRON 4 MG/1
4 TABLET, ORALLY DISINTEGRATING ORAL EVERY 6 HOURS PRN
Status: DISCONTINUED | OUTPATIENT
Start: 2024-06-03 | End: 2024-06-04

## 2024-06-03 NOTE — PROGRESS NOTES
1655: Patient arrives to labor and delivery unit for induction of labor.     1710: Dr. Cisneros at bedside assessing patient. VSE completed and noted to be 1/0/-3/ Cook catheter placed, 60 of fluid in uterus, 60 in vagina.    1900: Bedside and Verbal shift change report given to MARY Brewer (oncoming nurse) by MARY Sutton (offgoing nurse). Report included the following information Nurse Handoff Report, MAR, and Recent Results.

## 2024-06-03 NOTE — H&P
History & Physical    Name: Orquidea Perera MRN: [id number]  SSN: [id number]    YOB: 1990  Age: 33 y.o.  Sex: female        Subjective:     Estimated Date of Delivery: 24  OB History          3    Para   2    Term   2            AB        Living   2         SAB        IAB        Ectopic        Molar        Multiple        Live Births   2                Ms. Perera is admitted with pregnancy at 39w1d for induction of labor. Prenatal course suspected GDM, polyhydramnios, and history of hemithyroidectomy. Please see prenatal records for details.    Past Medical History:   Diagnosis Date    Postpartum depression      Past Surgical History:   Procedure Laterality Date    THYROID LOBECTOMY       Social History     Occupational History    Not on file   Tobacco Use    Smoking status: Never    Smokeless tobacco: Never   Vaping Use    Vaping Use: Never used   Substance and Sexual Activity    Alcohol use: Not Currently    Drug use: Never    Sexual activity: Not on file     Family History   Problem Relation Age of Onset    Cancer Paternal Grandmother         colon cancer       Allergies   Allergen Reactions    Prednisone Hives     Prior to Admission medications    Medication Sig Start Date End Date Taking? Authorizing Provider   ondansetron (ZOFRAN-ODT) 4 MG disintegrating tablet  24   ProviderArely MD   Prenatal Vit-Fe Fumarate-FA (PRENATAL 19 PO) Take by mouth    Provider, MD Arely        Review of Systems: Pertinent items are noted in HPI.    Objective:     Vitals:  There were no vitals filed for this visit.     Physical Exam:  Gen: No acute distress   EFW #8.5  Membranes:  Intact  Fetal Heart Rate: Cat I reviewed, moderate variability, +accels, no decels   SVE: 1/0/-3  Cephalic on exam, ballotable head   Cook 60/60 placed without difficulty     Prenatal Labs:   No components found for: \"OBEXTABORH\", \"OBEXTABSCRN\", \"OBEXTRUBELLA\", \"OBEXTGRBS\", \"OBEXTHBSAG\", \"OBEXTHIV\",

## 2024-06-03 NOTE — PROGRESS NOTES
1915: Cook Cath dislodged at this time per patient.     0135: This RN discussed patient current status with ASHLEE Mane. Medication added to patient's MAR per ASHLEE UMANA, see MAR.

## 2024-06-04 ENCOUNTER — ANESTHESIA EVENT (OUTPATIENT)
Facility: HOSPITAL | Age: 34
End: 2024-06-04
Payer: COMMERCIAL

## 2024-06-04 ENCOUNTER — ANESTHESIA (OUTPATIENT)
Facility: HOSPITAL | Age: 34
End: 2024-06-04
Payer: COMMERCIAL

## 2024-06-04 LAB
GLUCOSE BLD STRIP.AUTO-MCNC: 103 MG/DL (ref 65–117)
GLUCOSE BLD STRIP.AUTO-MCNC: 107 MG/DL (ref 65–117)
GLUCOSE BLD STRIP.AUTO-MCNC: 108 MG/DL (ref 65–117)
GLUCOSE BLD STRIP.AUTO-MCNC: 118 MG/DL (ref 65–117)
SERVICE CMNT-IMP: ABNORMAL
SERVICE CMNT-IMP: NORMAL

## 2024-06-04 PROCEDURE — 4A1HXCZ MONITORING OF PRODUCTS OF CONCEPTION, CARDIAC RATE, EXTERNAL APPROACH: ICD-10-PCS | Performed by: OBSTETRICS & GYNECOLOGY

## 2024-06-04 PROCEDURE — 2580000003 HC RX 258

## 2024-06-04 PROCEDURE — 6360000002 HC RX W HCPCS: Performed by: STUDENT IN AN ORGANIZED HEALTH CARE EDUCATION/TRAINING PROGRAM

## 2024-06-04 PROCEDURE — 2580000003 HC RX 258: Performed by: STUDENT IN AN ORGANIZED HEALTH CARE EDUCATION/TRAINING PROGRAM

## 2024-06-04 PROCEDURE — 6370000000 HC RX 637 (ALT 250 FOR IP): Performed by: OBSTETRICS & GYNECOLOGY

## 2024-06-04 PROCEDURE — 2500000003 HC RX 250 WO HCPCS: Performed by: NURSE ANESTHETIST, CERTIFIED REGISTERED

## 2024-06-04 PROCEDURE — 6360000002 HC RX W HCPCS: Performed by: OBSTETRICS & GYNECOLOGY

## 2024-06-04 PROCEDURE — 10907ZC DRAINAGE OF AMNIOTIC FLUID, THERAPEUTIC FROM PRODUCTS OF CONCEPTION, VIA NATURAL OR ARTIFICIAL OPENING: ICD-10-PCS | Performed by: OBSTETRICS & GYNECOLOGY

## 2024-06-04 PROCEDURE — 7220000101 HC DELIVERY VAGINAL/SINGLE: Performed by: OBSTETRICS & GYNECOLOGY

## 2024-06-04 PROCEDURE — 3700000025 EPIDURAL BLOCK: Performed by: ANESTHESIOLOGY

## 2024-06-04 PROCEDURE — 94761 N-INVAS EAR/PLS OXIMETRY MLT: CPT

## 2024-06-04 PROCEDURE — 51702 INSERT TEMP BLADDER CATH: CPT

## 2024-06-04 PROCEDURE — 6360000002 HC RX W HCPCS

## 2024-06-04 PROCEDURE — 82962 GLUCOSE BLOOD TEST: CPT

## 2024-06-04 PROCEDURE — 7210000100 HC LABOR FEE PER 1 HR: Performed by: OBSTETRICS & GYNECOLOGY

## 2024-06-04 PROCEDURE — 6360000002 HC RX W HCPCS: Performed by: NURSE ANESTHETIST, CERTIFIED REGISTERED

## 2024-06-04 PROCEDURE — 2500000003 HC RX 250 WO HCPCS

## 2024-06-04 PROCEDURE — 1120000000 HC RM PRIVATE OB

## 2024-06-04 PROCEDURE — 2500000003 HC RX 250 WO HCPCS: Performed by: ANESTHESIOLOGY

## 2024-06-04 PROCEDURE — 3E0DXGC INTRODUCTION OF OTHER THERAPEUTIC SUBSTANCE INTO MOUTH AND PHARYNX, EXTERNAL APPROACH: ICD-10-PCS | Performed by: OBSTETRICS & GYNECOLOGY

## 2024-06-04 PROCEDURE — 2700000000 HC OXYGEN THERAPY PER DAY

## 2024-06-04 PROCEDURE — 3700000156 HC EPIDURAL ANESTHESIA: Performed by: NURSE ANESTHETIST, CERTIFIED REGISTERED

## 2024-06-04 RX ORDER — ACETAMINOPHEN 500 MG
1000 TABLET ORAL EVERY 6 HOURS PRN
Status: DISCONTINUED | OUTPATIENT
Start: 2024-06-04 | End: 2024-06-04

## 2024-06-04 RX ORDER — BUTALBITAL, ACETAMINOPHEN AND CAFFEINE 50; 325; 40 MG/1; MG/1; MG/1
1 TABLET ORAL EVERY 4 HOURS PRN
Status: DISCONTINUED | OUTPATIENT
Start: 2024-06-04 | End: 2024-06-04

## 2024-06-04 RX ORDER — LANOLIN/MINERAL OIL
LOTION (ML) TOPICAL PRN
Status: DISCONTINUED | OUTPATIENT
Start: 2024-06-04 | End: 2024-06-06 | Stop reason: HOSPADM

## 2024-06-04 RX ORDER — BUPIVACAINE HYDROCHLORIDE 2.5 MG/ML
INJECTION, SOLUTION EPIDURAL; INFILTRATION; INTRACAUDAL PRN
Status: DISCONTINUED | OUTPATIENT
Start: 2024-06-04 | End: 2024-06-04 | Stop reason: SDUPTHER

## 2024-06-04 RX ORDER — EPHEDRINE SULFATE/0.9% NACL/PF 25 MG/5 ML
SYRINGE (ML) INTRAVENOUS
Status: COMPLETED
Start: 2024-06-04 | End: 2024-06-04

## 2024-06-04 RX ORDER — ONDANSETRON 2 MG/ML
4 INJECTION INTRAMUSCULAR; INTRAVENOUS EVERY 6 HOURS PRN
Status: DISCONTINUED | OUTPATIENT
Start: 2024-06-04 | End: 2024-06-04

## 2024-06-04 RX ORDER — EPHEDRINE SULFATE/0.9% NACL/PF 25 MG/5 ML
10 SYRINGE (ML) INTRAVENOUS ONCE
Status: COMPLETED | OUTPATIENT
Start: 2024-06-04 | End: 2024-06-04

## 2024-06-04 RX ORDER — ACETAMINOPHEN 500 MG
1000 TABLET ORAL EVERY 8 HOURS
Status: DISCONTINUED | OUTPATIENT
Start: 2024-06-04 | End: 2024-06-06 | Stop reason: HOSPADM

## 2024-06-04 RX ORDER — SODIUM CHLORIDE 0.9 % (FLUSH) 0.9 %
5-40 SYRINGE (ML) INJECTION PRN
Status: DISCONTINUED | OUTPATIENT
Start: 2024-06-04 | End: 2024-06-06 | Stop reason: HOSPADM

## 2024-06-04 RX ORDER — ONDANSETRON 2 MG/ML
4 INJECTION INTRAMUSCULAR; INTRAVENOUS EVERY 6 HOURS PRN
Status: DISCONTINUED | OUTPATIENT
Start: 2024-06-04 | End: 2024-06-06 | Stop reason: HOSPADM

## 2024-06-04 RX ORDER — SODIUM CHLORIDE 0.9 % (FLUSH) 0.9 %
5-40 SYRINGE (ML) INJECTION EVERY 12 HOURS SCHEDULED
Status: DISCONTINUED | OUTPATIENT
Start: 2024-06-04 | End: 2024-06-06 | Stop reason: HOSPADM

## 2024-06-04 RX ORDER — NALOXONE HYDROCHLORIDE 0.4 MG/ML
INJECTION, SOLUTION INTRAMUSCULAR; INTRAVENOUS; SUBCUTANEOUS PRN
Status: DISCONTINUED | OUTPATIENT
Start: 2024-06-04 | End: 2024-06-04

## 2024-06-04 RX ORDER — FAMOTIDINE 20 MG/1
20 TABLET, FILM COATED ORAL 2 TIMES DAILY PRN
Status: DISCONTINUED | OUTPATIENT
Start: 2024-06-04 | End: 2024-06-06 | Stop reason: HOSPADM

## 2024-06-04 RX ORDER — IBUPROFEN 800 MG/1
800 TABLET ORAL EVERY 8 HOURS
Status: DISCONTINUED | OUTPATIENT
Start: 2024-06-04 | End: 2024-06-06 | Stop reason: HOSPADM

## 2024-06-04 RX ORDER — SODIUM CHLORIDE, SODIUM LACTATE, POTASSIUM CHLORIDE, CALCIUM CHLORIDE 600; 310; 30; 20 MG/100ML; MG/100ML; MG/100ML; MG/100ML
INJECTION, SOLUTION INTRAVENOUS CONTINUOUS
Status: DISCONTINUED | OUTPATIENT
Start: 2024-06-04 | End: 2024-06-04

## 2024-06-04 RX ORDER — FENTANYL/BUPIVACAINE/NS/PF 2-1250MCG
10 PLASTIC BAG, INJECTION (ML) INJECTION CONTINUOUS
Status: DISCONTINUED | OUTPATIENT
Start: 2024-06-04 | End: 2024-06-04

## 2024-06-04 RX ORDER — DIPHENHYDRAMINE HYDROCHLORIDE 50 MG/ML
50 INJECTION INTRAMUSCULAR; INTRAVENOUS ONCE
Status: COMPLETED | OUTPATIENT
Start: 2024-06-04 | End: 2024-06-04

## 2024-06-04 RX ORDER — SODIUM CHLORIDE 9 MG/ML
INJECTION, SOLUTION INTRAVENOUS PRN
Status: DISCONTINUED | OUTPATIENT
Start: 2024-06-04 | End: 2024-06-06 | Stop reason: HOSPADM

## 2024-06-04 RX ORDER — DOCUSATE SODIUM 100 MG/1
100 CAPSULE, LIQUID FILLED ORAL 2 TIMES DAILY PRN
Status: DISCONTINUED | OUTPATIENT
Start: 2024-06-04 | End: 2024-06-06 | Stop reason: HOSPADM

## 2024-06-04 RX ORDER — LIDOCAINE HYDROCHLORIDE AND EPINEPHRINE 15; 5 MG/ML; UG/ML
INJECTION, SOLUTION EPIDURAL PRN
Status: DISCONTINUED | OUTPATIENT
Start: 2024-06-04 | End: 2024-06-04 | Stop reason: SDUPTHER

## 2024-06-04 RX ADMIN — BUPIVACAINE HYDROCHLORIDE 5 ML: 2.5 INJECTION, SOLUTION EPIDURAL; INFILTRATION; INTRACAUDAL at 10:10

## 2024-06-04 RX ADMIN — LIDOCAINE HYDROCHLORIDE AND EPINEPHRINE 3 ML: 15; 5 INJECTION, SOLUTION EPIDURAL at 10:07

## 2024-06-04 RX ADMIN — ACETAMINOPHEN 1000 MG: 500 TABLET ORAL at 18:07

## 2024-06-04 RX ADMIN — SODIUM CHLORIDE, POTASSIUM CHLORIDE, SODIUM LACTATE AND CALCIUM CHLORIDE: 600; 310; 30; 20 INJECTION, SOLUTION INTRAVENOUS at 01:23

## 2024-06-04 RX ADMIN — ACETAMINOPHEN 1000 MG: 500 TABLET ORAL at 01:58

## 2024-06-04 RX ADMIN — BUPIVACAINE HYDROCHLORIDE 5 ML: 2.5 INJECTION, SOLUTION EPIDURAL; INFILTRATION; INTRACAUDAL at 10:15

## 2024-06-04 RX ADMIN — EPHEDRINE SULFATE 10 MG: 5 INJECTION INTRAVENOUS at 10:35

## 2024-06-04 RX ADMIN — OXYTOCIN 2 MILLI-UNITS/MIN: 10 INJECTION INTRAVENOUS at 06:55

## 2024-06-04 RX ADMIN — ONDANSETRON 4 MG: 2 INJECTION INTRAMUSCULAR; INTRAVENOUS at 01:23

## 2024-06-04 RX ADMIN — Medication 10 ML/HR: at 10:29

## 2024-06-04 RX ADMIN — Medication 10 MG: at 10:35

## 2024-06-04 RX ADMIN — DIPHENHYDRAMINE HYDROCHLORIDE 50 MG: 50 INJECTION INTRAMUSCULAR; INTRAVENOUS at 02:35

## 2024-06-04 RX ADMIN — ONDANSETRON 4 MG: 2 INJECTION INTRAMUSCULAR; INTRAVENOUS at 10:26

## 2024-06-04 RX ADMIN — IBUPROFEN 800 MG: 800 TABLET, FILM COATED ORAL at 18:07

## 2024-06-04 ASSESSMENT — PAIN SCALES - GENERAL
PAINLEVEL_OUTOF10: 6
PAINLEVEL_OUTOF10: 4

## 2024-06-04 ASSESSMENT — PAIN DESCRIPTION - DESCRIPTORS: DESCRIPTORS: SORE

## 2024-06-04 ASSESSMENT — PAIN DESCRIPTION - LOCATION
LOCATION: VAGINA;PERINEUM
LOCATION: HEAD

## 2024-06-04 NOTE — PROGRESS NOTES
Labor Progress Note    Patient Name:Orquidea Perera  :1990  MRN: [id number]    Patient seen, fetal heart rate and contraction pattern evaluated, patient examined.comfortable with epidural  Vitals:    24 1407   BP: 120/75   Pulse: 93   Resp: 16   Temp: 98.6 °F (37 °C)   SpO2: 98%        Physical Exam:  Cervical Exam:  7-8 cm dilated    80% effaced    -3 station  ballotable  Membranes:   AROM with fundal pressure and pundendal needle with controlled release of fluid  Uterine Activity: Frequency: Every 140 minutes  Fetal Heart Rate: Baseline: 140 per minute  Variability: moderate  Accelerations: yes  Decelerations: occ varible    Assessment/Plan:  Reassuring fetal status, monitor closely s/p AROM    Rodriguez Joaquin MD  2024

## 2024-06-04 NOTE — ANESTHESIA PROCEDURE NOTES
Epidural Block    Patient location during procedure: OB  Start time: 6/4/2024 9:56 AM  End time: 6/4/2024 10:15 AM  Reason for block: labor epidural  Staffing  Performed: resident/CRNA   Resident/CRNA: Leslie Mireles APRN - CRNA  Performed by: Leslie Mireles APRN - CRNA  Authorized by: Yong Bruce DO    Epidural  Patient position: sitting  Prep: ChloraPrep and site prepped and draped  Patient monitoring: continuous pulse ox and frequent blood pressure checks  Approach: midline  Location: L3-4  Injection technique: MANOLO air  Provider prep: sterile gloves and mask  Needle  Needle type: Tuohy   Needle gauge: 17 G  Needle length: 3.5 in  Needle insertion depth: 4.5 cm  Catheter type: multi-orifice  Catheter size: 20 G  Catheter at skin depth: 8.5 cm  Test dose: negativeCatheter Secured: tegaderm and tape  Assessment  Hemodynamics: stable  Attempts: 1  Outcomes: uncomplicated and patient tolerated procedure well  Additional Notes  Easily placed on first pass; neg heme, neg paresthesia, neg CSF w MANOLO at 4.5cm. Catheter easily threaded to 8.5 cm. Pt tolerated v well.  Preanesthetic Checklist  Completed: patient identified, IV checked, site marked, risks and benefits discussed, surgical/procedural consents, equipment checked, pre-op evaluation, timeout performed, anesthesia consent given, oxygen available, monitors applied/VS acknowledged, fire risk safety assessment completed and verbalized and blood product R/B/A discussed and consented

## 2024-06-04 NOTE — PROGRESS NOTES
Labor Progress Note    Patient Name:Orquidea Perera  :1990  MRN: [id number]    Patient seen, fetal heart rate and contraction pattern evaluated, patient examined.  CTXs more uncomfortable. No LOF  Vitals:    24 0852   BP:    Pulse:    Resp:    Temp: 98 °F (36.7 °C)   SpO2:         Physical Exam:  Cervical Exam:  7 cm dilated    80% effaced    -3 station /ballotable  Membranes:  Intact  Uterine Activity: Frequency: Every 2.5-4 minutes  Fetal Heart Rate: Baseline: 160 per minute  Variability: moderate  Accelerations: yes  Decelerations: none    Assessment/Plan:  Reassuring fetal status, vertex is very high.  Concern for cord accident with ROM.  Will get epidural asap and then consider needling membranes.  D/w pt and partner concerns regarding above, ie poss emergency c/s, etc.     Rodriguez Joaquin MD  2024

## 2024-06-04 NOTE — PROGRESS NOTES
0700: Bedside and Verbal shift change report given to CHRISTINE Estrella RN (oncoming nurse) by EDNA Snyder RN (offgoing nurse). Report included the following information Nurse Handoff Report, Index, Adult Overview, Intake/Output, MAR, Recent Results, and Event Log.     0842: MD Joaquin at bedside. SVE 6-7/80/Ballotable. MD is going to hold off on AROM at this time until patient gets epidural d/t station of baby. Pt bolusing for epidural at this time.    0900: VORB from MD Joaquin to stop pitocin at this time until patient gets epidural placed.    0902: Pitocin stopped at this time.    0950: RN at bedside d/t patient having increased pain. SVE by this RN and Charge RN 7/80 with a buldging bag. MD Joaquin made aware. Pt getting epidural shortly.    1002: Time out for epidural placement with TESS Mireles.    1007: Test dose administered by CRNA.    1008: Bolus dose administered by CRNA. Pt tolerated procedure well.    1035: 10 mg ephedrine given at this time.    1215: RN spoke with MD Joaquin and gave him an update on patient status. MD states RN can go ahead and re-start pitocin.     1234: Pitocin restarted at this time. Delivery table made.    1318: MD Joaquin at bedside. MD attempted SVE but was unable to determine station d/t BBOW. MD states he will recheck patient shortly and told RN to keep pitocin at 2.     1447: MD Joaquin and OB Hospitalist MD Witt at bedside. AROM at this time with pudendal needle and then finished with amnio hook. Large amount of clear fluid noted to pad. SVE 7/80/-2. MD states RN can increase pitocin if needed.    1536: Pt complaining of increased pressure. Pt gives consent for this RN and RN Glenn to check her. SVE by this RN. Pt is 9/100/0. RN to make MD Joaquin aware.     1539: MD Joaquin made aware of patient's SVE. MD coming shortly. MD stated to let OB Hospitalist know of exam.    1541: Md Witt made aware of SVE. MD to come sit at nurses station until MD Joaquin arrives.    1551: RN

## 2024-06-04 NOTE — DISCHARGE SUMMARY
Obstetrical Discharge Summary     Name: Orquidea Perera MRN: [id number]  SSN: [id number]    YOB: 1990  Age: 33 y.o.  Sex: female      Allergies: Prednisone    Admit Date: 6/3/2024    Discharge Date: 24    Admitting Physician: Lisa Cisneros MD     Attending Physician:  Silvia Akhtar DO     * Admission Diagnoses: Gestational diabetes [O24.419]    * Discharge Diagnoses:   Information for the patient's :  Mackenzie Perera [[id number]]   @[id number]@      Additional Diagnoses:    Lab Results   Component Value Date/Time    ABORH B POSITIVE 2024 05:40 PM      There is no immunization history on file for this patient.    * Procedures: Term  with repair  * No surgery found *           * Discharge Condition: Good    * Hospital Course: Normal hospital course following the delivery.    * Disposition: Home    Discharge Medications:         Medication List        ASK your doctor about these medications      ondansetron 4 MG disintegrating tablet  Commonly known as: ZOFRAN-ODT     PRENATAL 19 PO               * Follow-up Care/Patient Instructions:  Activity: no sex for 6 weeks and no heavy lifting for 6 weeks  Diet: regular diet  Wound Care: as directed    RTO in 4-6 weeks or prn      Follow-up Information    None          Rodriguez Joaquin MD  2024

## 2024-06-04 NOTE — PROCEDURES
Delivery Note    Obstetrician:  Rodriguez Joaquin MD    Assistant: none    Pre-Delivery Diagnosis: Term pregnancy, Induced labor, Single fetus, and GDM, polydramnios, possible macrosomia    Post-Delivery Diagnosis: Living  infant(s) and Male    Intrapartum Event: None    Procedure: Spontaneous vaginal delivery    Epidural: yes    Monitor:  Fetal Heart Tones - External and Uterine Contractions - External    Indications for instrumental delivery: none    Estimated Blood Loss: 300 ml    Episiotomy: none    Laceration(s):  1st degree and vaginal    Laceration(s) repair: single 3-0 vicryl     Presentation: Cephalic    Fetal Description: esquivel    Fetal Position: Occiput Anterior    Birth Weight: pending    Birth Length: pending    Apgar - One Minute: 9    Apgar - Five Minutes: 9    Umbilical Cord: 3 vessels present  Specimens: none  Complications:  none           Cord Blood Results:   Information for the patient's :  Mackenzie Perera [[id number]]   No results found for: \"PCTDIG\", \"BILI\"   Prenatal Labs:   No components found for: \"PCTABR\", \"OBEXTABSCRN\", \"OBEXTHBSAG\", \"OBEXTHIV\", \"OBEXTRUBELLA\", \"OBEXTRPR\", \"OBEXTGONORR\", \"OBEXTCHLAM\", \"OBEXTGRBS\"     Attending Attestation: I was present and scrubbed for the entire procedure.               
19-May-2024 00:57

## 2024-06-04 NOTE — PROGRESS NOTES
Labor Note    Orquidea Perera  [id number]  1990   39w1d      S:  Coping well with contractions s/p Miso and cook balloon.    O:    /68   Pulse 81   Temp 98.5 °F (36.9 °C)   Resp 16   Ht 1.651 m (5' 5\")   Wt 73 kg (161 lb)   SpO2 97%   BMI 26.79 kg/m²      FHT  Baseline:135 bpm  Moderate variability  Accels present  No decelerations  Ctx/[place]: 2.5-4 min, Moderate to palpation, resting tone soft between  NST: Reactive,CAT I    SVE: /-3, ballottable vertex, membranes intact    A/P:  33 y.o.  @ 39w1d admitted for induction labor s/p cook balloon and Misoprostol. Pregnancy complicated by presumed GDM, Polyhydramnios and suspected macrosomia.  - GBS NEG  - CAT I FHT    Cook balloon expelled at 1915. SVE performed with pt consent./-3, vtx, ballottable.  Reviewed ctx pattern with POC to monitor and pitocin augmentation if ctx space out. Discussed use of pitocin with r/b/a. Pt agreeable.  Rec made for AMTSL. Pt agreeable.   Plans un-medicated birth.    - FWB:  CEFM/[place]  - Labor course Expectant  - Pain control - Plans un-medicated birth, however not opposed to epidural if needs for back labor.  - Anticipate .      Signed:  CHINA Knight

## 2024-06-04 NOTE — PROGRESS NOTES
Labor Note    Orquidea Perera  [id number]  1990   39w2d      S:  Coping well with contractions. Reports +Bloody show. Headache resolved after benadryl and rest period.    O:    BP (!) 141/79   Pulse 90   Temp 98.3 °F (36.8 °C) (Oral)   Resp 16   Ht 1.651 m (5' 5\")   Wt 73 kg (161 lb)   SpO2 99%   BMI 26.79 kg/m²      FHT  Baseline:140 bpm  Moderate variability  Accels: Present  Decels: Early, periodic variable  Ctx/[place]: 3-5 min, Moderate to strong to palpation, resting tone soft between.    SVE:/-3, Vertex, Membranes intact    A/P:  33 y.o.  @ 39w2d admitted for induction of labor, s/p cook balloon and Miso. Pregnancy complicated by Pregnancy complicated by presumed GDM, Polyhydramnios and suspected macrosomia.   - GBS Neg  -CAT I FHT    SVE performed with pt consent. -3, vertex, suspect asynclitic.   Rec made to place abdominal binder to assist with fetal positioning. Advised ok to remove if unable to tolerate. Pt agreeable.  Rec made for pitocin augmentation with r/b/a at this time as ctx pattern has spaced. Pt agreeable.     - FWB:  CEFM/[place]  - Labor course Pitocin per unit protocol.  -GDM: Continue BS checks per unit protocol.   - Pain control - Plans un-medicated birth.  - Anticipate .      Signed:  CHINA Knight

## 2024-06-04 NOTE — ANESTHESIA PRE PROCEDURE
Department of Anesthesiology  Preprocedure Note       Name:  Orquidea Perera   Age:  33 y.o.  :  1990                                          MRN:  [id number]         Date:  2024      Surgeon: * No surgeons listed *    Procedure: * No procedures listed *    Medications prior to admission:   Prior to Admission medications    Medication Sig Start Date End Date Taking? Authorizing Provider   ondansetron (ZOFRAN-ODT) 4 MG disintegrating tablet  24   ProviderArely MD   Prenatal Vit-Fe Fumarate-FA (PRENATAL 19 PO) Take by mouth    Provider, MD Arely       Current medications:    Current Facility-Administered Medications   Medication Dose Route Frequency Provider Last Rate Last Admin   • butalbital-acetaminophen-caffeine (FIORICET, ESGIC) per tablet 1 tablet  1 tablet Oral Q4H PRN Javier Mane APRN - CNM       • acetaminophen (TYLENOL) tablet 1,000 mg  1,000 mg Oral Q6H PRN Isaiah William MD   1,000 mg at 24 0158   • oxytocin (PITOCIN) 30 units in 500 mL infusion  2-20 griselda-units/min IntraVENous Continuous Javier Mane APRN - CNM   Stopped at 24 0902   • fentaNYL 2 mcg/mL BUPivacaine 0.125% in sodium chloride 0.9% 100 mL epidural infusion  10 mL/hr Epidural Continuous Yong Bruce DO 10 mL/hr at 24 1029 10 mL/hr at 24 1029   • naloxone 0.4 mg in 10 mL sodium chloride syringe   IntraVENous PRN Yong Bruce DO       • ondansetron (ZOFRAN) injection 4 mg  4 mg IntraVENous Q6H PRN Yong Bruce DO       • lactated ringers IV soln infusion   IntraVENous Continuous Lisa Cisneros  mL/hr at 24 0123 New Bag at 24 0123   • lactated ringers bolus 500 mL  500 mL IntraVENous PRN Lisa Cisneros MD        Or   • lactated ringers bolus 1,000 mL  1,000 mL IntraVENous PRN Lisa Cisneros MD       • sodium chloride flush 0.9 % injection 5-40 mL  5-40 mL IntraVENous 2 times per day Lisa Cisneros MD       • sodium chloride flush

## 2024-06-05 LAB — T PALLIDUM AB SER QL IA: NON REACTIVE

## 2024-06-05 PROCEDURE — 6370000000 HC RX 637 (ALT 250 FOR IP): Performed by: OBSTETRICS & GYNECOLOGY

## 2024-06-05 PROCEDURE — 1120000000 HC RM PRIVATE OB

## 2024-06-05 RX ADMIN — IBUPROFEN 800 MG: 800 TABLET, FILM COATED ORAL at 16:31

## 2024-06-05 RX ADMIN — ACETAMINOPHEN 1000 MG: 500 TABLET ORAL at 17:38

## 2024-06-05 RX ADMIN — IBUPROFEN 800 MG: 800 TABLET, FILM COATED ORAL at 01:43

## 2024-06-05 RX ADMIN — DOCUSATE SODIUM 100 MG: 100 CAPSULE, LIQUID FILLED ORAL at 07:57

## 2024-06-05 RX ADMIN — ACETAMINOPHEN 1000 MG: 500 TABLET ORAL at 01:43

## 2024-06-05 RX ADMIN — ACETAMINOPHEN 1000 MG: 500 TABLET ORAL at 09:32

## 2024-06-05 RX ADMIN — IBUPROFEN 800 MG: 800 TABLET, FILM COATED ORAL at 07:57

## 2024-06-05 ASSESSMENT — PAIN - FUNCTIONAL ASSESSMENT
PAIN_FUNCTIONAL_ASSESSMENT: ACTIVITIES ARE NOT PREVENTED

## 2024-06-05 ASSESSMENT — PAIN DESCRIPTION - LOCATION
LOCATION: ABDOMEN

## 2024-06-05 ASSESSMENT — PAIN DESCRIPTION - DESCRIPTORS
DESCRIPTORS: CRAMPING
DESCRIPTORS: ACHING;CRAMPING
DESCRIPTORS: CRAMPING
DESCRIPTORS: SORE
DESCRIPTORS: CRAMPING

## 2024-06-05 ASSESSMENT — PAIN SCALES - GENERAL
PAINLEVEL_OUTOF10: 6
PAINLEVEL_OUTOF10: 7
PAINLEVEL_OUTOF10: 4
PAINLEVEL_OUTOF10: 4

## 2024-06-05 ASSESSMENT — PAIN DESCRIPTION - ORIENTATION
ORIENTATION: LOWER

## 2024-06-05 NOTE — CARE COORDINATION
06/05/24  1:00 pm    CM met with MOB and FOB at bedside. CM confirmed demographics. MOB lives with , 2 sons (9 years old and 20 months) and in-laws. MOB reports good support system.  MOB plans to breastfeed and has breast pump in room.    MOB has appropriate supplies for baby to include: car seat, bassinet, diapers/wipes.  FOB has [place] [employer] through his employer and understands how to add baby to insurance.  MOB plans to [facility]- [facility] and has an appointment scheduled for Friday.         06/05/24 1804   Service Assessment   Patient Orientation Alert and Oriented   Cognition Alert   History Provided By Patient   Primary Caregiver Self   Support Systems Spouse/Significant Other;Family Members   PCP Verified by CM Yes   Last Visit to PCP Within last 3 months   Prior Functional Level Independent in ADLs/IADLs   Current Functional Level Independent in ADLs/IADLs   Can patient return to prior living arrangement Yes   Ability to make needs known: Good   Family able to assist with home care needs: Yes   Social/Functional History   Lives With Spouse   Type of Home House     Carmen Rivas RN, [facility]  [facility]

## 2024-06-05 NOTE — PROGRESS NOTES
Post-Partum Day Number 1 Progress Note    Orquidea Perera       Information for the patient's :  Trever, Male Orquidea [[id number]]   Vaginal, Spontaneous  Patient doing well without significant complaint.  Voiding without difficulty, normal lochia.    Vitals:  /72   Pulse 77   Temp 98.2 °F (36.8 °C) (Oral)   Resp 16   Ht 1.651 m (5' 5\")   Wt 73 kg (161 lb)   SpO2 100%   Breastfeeding Unknown   BMI 26.79 kg/m²   Temp (24hrs), Av.4 °F (36.9 °C), Min:97.9 °F (36.6 °C), Max:98.7 °F (37.1 °C)        Exam:   Patient without distress.                FF @ U-2 NT                LE NT w/o edema    Labs:     Lab Results   Component Value Date/Time    WBC 10.7 2024 05:40 PM    WBC 12.7 2024 11:12 AM    HGB 9.8 2024 05:40 PM    HGB 10.3 2024 11:12 AM    HCT 30.9 2024 05:40 PM    HCT 32.2 2024 11:12 AM     2024 05:40 PM     2024 11:12 AM       Recent Results (from the past 24 hour(s))   POCT Glucose    Collection Time: 24 10:37 AM   Result Value Ref Range    POC Glucose 103 65 - 117 mg/dL    Performed by: DEJUAN CORTÉS RN    POCT Glucose    Collection Time: 24 12:36 PM   Result Value Ref Range    POC Glucose 118 (H) 65 - 117 mg/dL    Performed by: DEJUAN CROTÉS RN    POCT Glucose    Collection Time: 24  3:16 PM   Result Value Ref Range    POC Glucose 108 65 - 117 mg/dL    Performed by: DEJUAN CORTÉS RN          Assessment: PPD 1 s/p     Plan:  1. VMI / Rh pos / Rubella immune / Circ desired  2.  Continue routine postpartum care  3.  GDM:  6-12 wk 2 hr GTT.      Silvia Akhtar DO, FACOG  [facility]

## 2024-06-05 NOTE — LACTATION NOTE
This note was copied from a baby's chart.  This is mother's second baby to .   and pumped for 2nd baby for several months.  2nd baby also had tongue/lip tie and breastfeeding symptoms resolved with chiropractic care and lactation support. Mother feels confident with these resources. Infant oral exam not performed at this time as baby is actively nursing.  Mother independently latches baby alongside her in semireclined position. Sustained rhythmic suckle with audible spontaneous swallows observed.      Discussed with mother her plan for feeding.  Reviewed the benefits of exclusive breast milk feeding during the hospital stay.   Informed her of the risks of using formula to supplement in the first few days of life as well as the benefits of successful breast milk feeding; referred her to the Breastfeeding booklet about this information.   She acknowledges understanding of information reviewed and states that it is her plan to breastfeeding her infant.  Will support her choice and offer additional information as needed.     Reviewed breastfeeding basics:  How milk is made and normal  breastfeeding behaviors discussed.  Supply and demand,  stomach size, early feeding cues, skin to skin bonding with comfortable positioning and baby led latch-on reviewed.  How to identify signs of successful breastfeeding sessions reviewed; education on asymetrical latch, signs of effective latching vs shallow, in-effective latching, normal  feeding frequency and duration and expected infant output discussed.  Normal course of breastfeeding discussed including the AAP's recommendation that children receive exclusive breast milk feedings for the first six months of life with breast milk feedings to continue through the first year of life and/or beyond as complimentary table foods are added.  Breastfeeding Booklet and Warm line information provided with discussion.  Discussed typical  weight loss and

## 2024-06-05 NOTE — DISCHARGE INSTRUCTIONS
Discharge Instructions for Vaginal Delivery    Patient ID:  Orquidea Perera  [id number]  33 y.o.  1990    Take Home Medications       Continue taking your prenatal vitamins if you are breastfeeding.    Follow-up care is a key part of your treatment and safety. Please schedule and keep appointments.  Follow-up with your primary OB in 6 weeks.    Activity  Avoid anything in your vagina for 6 weeks (no intercourse, tampons, or douching).  You may drive unless you are taking prescription pain medications.  Climbing stairs and light lifting are okay.  Please avoid excessive exercise, though walking is okay- you'll be tired!    Diet  Regular diet as tolerated.  Be sure to drink plenty of fluids if you are breastfeeding.    Wound care  If you have stitches, continue to rinse with a squirt bottle of warm water each time you void for about 7-10 days..  Your stitches will gradually dissolve over four to eight weeks.  Sitz baths are also helpful to keep the wound clean, encourage healing, and to help with pain associated with the stitches or hemorrhoids.  You can use either a sitz bath basin or a bathtub filled with 2-3\" inches of plain warm water.  Soak for 10 minutes 3 times a day as tolerated.    Pain Management  Over the counter medications such as Tylenol and ibuprofen (Motrin or Advil) are ideal.  These may be taken together, alternating doses.  You may  take the maximum dose:  Motrin or Advil (generic ibuprofen), either 3 tablets every 6 hours or 4 tablets every 8 hours or Tylenol (acetominophen) 1000mg every 6 hours (equivalent to 2 extra strength Tylenol).  You may also have a precrescription for stronger pain medication.  Take only as needed and transition to over the counter medication in the next few days. Minimize amounts of the prescription medication, as it can be habit-forming and will worsen or cause constipation. Most patients will find that within a couple of days, their pain is adequately controlled

## 2024-06-06 VITALS
WEIGHT: 161 LBS | HEART RATE: 84 BPM | BODY MASS INDEX: 26.82 KG/M2 | DIASTOLIC BLOOD PRESSURE: 85 MMHG | OXYGEN SATURATION: 99 % | TEMPERATURE: 98.1 F | RESPIRATION RATE: 14 BRPM | SYSTOLIC BLOOD PRESSURE: 126 MMHG | HEIGHT: 65 IN

## 2024-06-06 PROCEDURE — 94761 N-INVAS EAR/PLS OXIMETRY MLT: CPT

## 2024-06-06 PROCEDURE — 6370000000 HC RX 637 (ALT 250 FOR IP): Performed by: OBSTETRICS & GYNECOLOGY

## 2024-06-06 RX ORDER — IBUPROFEN 800 MG/1
800 TABLET ORAL EVERY 8 HOURS
Qty: 40 TABLET | Refills: 1 | Status: SHIPPED | OUTPATIENT
Start: 2024-06-06

## 2024-06-06 RX ADMIN — IBUPROFEN 800 MG: 800 TABLET, FILM COATED ORAL at 08:05

## 2024-06-06 RX ADMIN — ACETAMINOPHEN 1000 MG: 500 TABLET ORAL at 02:41

## 2024-06-06 RX ADMIN — IBUPROFEN 800 MG: 800 TABLET, FILM COATED ORAL at 00:30

## 2024-06-06 RX ADMIN — ACETAMINOPHEN 1000 MG: 500 TABLET ORAL at 11:16

## 2024-06-06 RX ADMIN — DOCUSATE SODIUM 100 MG: 100 CAPSULE, LIQUID FILLED ORAL at 08:05

## 2024-06-06 ASSESSMENT — PAIN DESCRIPTION - LOCATION
LOCATION: ABDOMEN

## 2024-06-06 ASSESSMENT — PAIN DESCRIPTION - DESCRIPTORS
DESCRIPTORS: CRAMPING
DESCRIPTORS: CRAMPING;SORE
DESCRIPTORS: CRAMPING
DESCRIPTORS: CRAMPING

## 2024-06-06 ASSESSMENT — PAIN DESCRIPTION - ORIENTATION
ORIENTATION: LOWER
ORIENTATION: LOWER

## 2024-06-06 ASSESSMENT — PAIN SCALES - GENERAL
PAINLEVEL_OUTOF10: 4
PAINLEVEL_OUTOF10: 8
PAINLEVEL_OUTOF10: 3
PAINLEVEL_OUTOF10: 9

## 2024-06-06 ASSESSMENT — PAIN - FUNCTIONAL ASSESSMENT
PAIN_FUNCTIONAL_ASSESSMENT: ACTIVITIES ARE NOT PREVENTED
PAIN_FUNCTIONAL_ASSESSMENT: ACTIVITIES ARE NOT PREVENTED

## 2024-06-06 NOTE — PROGRESS NOTES
Post-Partum Day Number 2 Progress Note    Patient doing well post-partum without significant complaints.  Voiding without difficulty, normal lochia.    Vitals:  Patient Vitals for the past 24 hrs:   BP Temp Temp src Pulse Resp SpO2   24 0735 126/85 98.1 °F (36.7 °C) Oral 84 14 99 %   24 2340 123/69 98.2 °F (36.8 °C) Oral 86 14 99 %   24 1535 112/72 98.6 °F (37 °C) Oral 82 16 99 %     Temp (24hrs), Av.3 °F (36.8 °C), Min:98.1 °F (36.7 °C), Max:98.6 °F (37 °C)      Vital signs stable, afebrile.    Exam:  Patient without distress.               Abdomen soft, fundus firm, nontender               Lower extremities, MILA nontender w/o edema    Labs: No results found for this or any previous visit (from the past 24 hour(s)).    Assessment and Plan:  PPD 2 s/p    VMI / Rh pos / Rubella immune / circ desired will perform this am  Routine postpartum/postop care.  Discharge today-instructions reviewed, follow up in office in 6 weeks.  GDM:  pp gtt planned    Silvia Akhtar DO, FACOG  [facility]

## 2024-06-06 NOTE — PROGRESS NOTES
Reviewed discharge instructions with Pt. Pt off unit in stable condition by wheelchair with volunteers for discharge home per Dr. Akhtar. Pt is aware to follow up in 6 weeks. Prescription sent to pharmacy. Pt denies any headache, dizziness, n/v, or pain at this time. Infant in car seat and discharged with mother.

## 2024-12-05 ENCOUNTER — OFFICE VISIT (OUTPATIENT)
Age: 34
End: 2024-12-05

## 2024-12-05 VITALS
TEMPERATURE: 98.3 F | HEART RATE: 78 BPM | SYSTOLIC BLOOD PRESSURE: 110 MMHG | BODY MASS INDEX: 21.77 KG/M2 | OXYGEN SATURATION: 98 % | DIASTOLIC BLOOD PRESSURE: 74 MMHG | WEIGHT: 130.8 LBS

## 2024-12-05 DIAGNOSIS — B96.89 BACTERIAL URI: Primary | ICD-10-CM

## 2024-12-05 DIAGNOSIS — J06.9 BACTERIAL URI: Primary | ICD-10-CM

## 2024-12-05 RX ORDER — AMOXICILLIN 875 MG/1
875 TABLET, COATED ORAL 2 TIMES DAILY
Qty: 14 TABLET | Refills: 0 | Status: SHIPPED | OUTPATIENT
Start: 2024-12-05 | End: 2024-12-12

## 2024-12-05 RX ORDER — SERTRALINE HYDROCHLORIDE 25 MG/1
25 TABLET, FILM COATED ORAL EVERY MORNING
COMMUNITY

## 2024-12-05 ASSESSMENT — ENCOUNTER SYMPTOMS: COUGH: 1

## 2024-12-06 NOTE — PATIENT INSTRUCTIONS
Thank you for visiting [facility] today.    Start Amoxicillin as prescribed    Nasal Congestion:  Flonase (over the counter) nasal spray, once a day  Saline irrigation kits help wash out sinuses 1-2 times a day  Normal saline nasal spray  Afrin nasal spray for no more than 3-5 days    Cough:  Throat lozenges, hot tea, and honey may help  Vicks VapoRub at night to help with cough and relieve muscles aches and pain  If not prescribed a cough medication, Delsym or Robitussin are options.  It is an over the counter cough medication containing dextromethorphan to help suppress cough at night  If you have high blood pressure, take Coricidin HBP (or the generic form) instead.  Follow instructions on the box.  For thick mucus, take Mucinex (with Guafenesin only) to help thin the mucus.  Follow instructions on the box.  You will need to drink plenty of water with this medication.    Sore Throat:  Lozenges, as needed. Cepacol lozenges will help numb the throat  Chloraseptic spray also helps to numb throat pain  Salt water gargles to soothe throat pain    Sinus pain/pressure:  Warm, wet towel on face to help with facial sinus pain/pressure    Headache/Pain Fever/Body Aches:  If you can take NSAIDs, take Ibuprofen 400-800mg every 8 hours as needed  If you cannot take NSAIDs, take Tylenol 325-500mg every 6 hours as needed    Miscellanous:  Zyrtec/Xyzal/Allegra/Claritin during the day or Benadryl at night may help with allergies.  These medications also come in decongestant forms and may be used if you are having nasal/sinus congestion.  Simple foods like chicken noodle soup, smoothies, hot tea with lemon and honey may also provide some relief.  Cool mist humidifier  Stay hydrated  Vitamin C 75-90 mg daily  Zinc 40 mg daily    Please follow up with your primary care provider within 2-5 days if your signs and symptoms have not resolved or worsened.  Please be mindful of features that warrant immediate attention:  new

## 2024-12-06 NOTE — PROGRESS NOTES
2024   Orquidea Perera (: 1990) is a 34 y.o. female, New patient, here for evaluation of the following chief complaint(s):  Cough (Pt present with cough and congestion, symptoms going on for 1 week.) and Congestion     ASSESSMENT/PLAN:  Below is the assessment and plan developed based on review of pertinent history, physical exam, labs, studies, and medications.  1. Bacterial URI  -     amoxicillin (AMOXIL) 875 MG tablet; Take 1 tablet by mouth 2 times daily for 7 days, Disp-14 tablet, R-0Normal    Patient is previously healthy and immunocompetent, presenting with symptoms suspicious for likely  upper respiratory infection.  Patient is nontoxic appearing and not in need of emergent medical intervention. Will treat with outpatient course of antibiotics.     -Provided reassurance and reassessment  -Discussed over-the-counter medications for symptomatic relief  -Provided educational material and patient instructions  -Discharged patient with instructions to follow up with PCP  -Advised to go immediately to the ED for worsening or persistent symptoms    Follow up:  Return in about 1 week (around 2024).  Follow up immediately for any new, worsening or changes or if symptoms are not improving over the next 5-7 days.     SUBJECTIVE/OBJECTIVE:  34 y.o. with concern for cough and congestion for the past 1 week.  She describes the cough is intermittently productive.  She is breast-feeding and has not taken anything for symptoms.  Patient is concerned because 2 family members in her house are currently being treated for pneumonia.  She denies any fevers or shortness of breath.      Cough         Diagnoses and all orders for this visit:  Bacterial URI  Other orders  -     amoxicillin (AMOXIL) 875 MG tablet; Take 1 tablet by mouth 2 times daily for 7 days      Cough (Pt present with cough and congestion, symptoms going on for 1 week.) and Congestion      No results found for any visits on 24.      XR